# Patient Record
Sex: MALE | URBAN - METROPOLITAN AREA
[De-identification: names, ages, dates, MRNs, and addresses within clinical notes are randomized per-mention and may not be internally consistent; named-entity substitution may affect disease eponyms.]

---

## 2018-06-04 ENCOUNTER — OFFICE VISIT (OUTPATIENT)
Dept: URBAN - METROPOLITAN AREA CLINIC 23 | Facility: CLINIC | Age: 38
End: 2018-06-04
Payer: COMMERCIAL

## 2018-06-04 DIAGNOSIS — H40.1132 PRIMARY OPEN-ANGLE GLAUCOMA, BILATERAL, MODERATE STAGE: Primary | ICD-10-CM

## 2018-06-04 PROCEDURE — 92133 CPTRZD OPH DX IMG PST SGM ON: CPT | Performed by: OPTOMETRIST

## 2018-06-04 PROCEDURE — 92004 COMPRE OPH EXAM NEW PT 1/>: CPT | Performed by: OPTOMETRIST

## 2018-06-04 ASSESSMENT — KERATOMETRY
OD: 42.13
OS: 42.50

## 2018-06-04 ASSESSMENT — INTRAOCULAR PRESSURE
OD: 15
OS: 16

## 2018-06-04 NOTE — IMPRESSION/PLAN
Impression: Primary open-angle glaucoma, bilateral, moderate stage: A90.9072. Plan: Discussed diagnosis in detail with patient. Discussed treatment options with patient. Reassured patient of current condition and treatment. Will continue to monitor IOP. Continue using current medication(s). Continue Timolol QAM OU.

## 2019-03-20 ENCOUNTER — OFFICE VISIT (OUTPATIENT)
Dept: URBAN - METROPOLITAN AREA CLINIC 29 | Facility: CLINIC | Age: 39
End: 2019-03-20
Payer: COMMERCIAL

## 2019-03-20 DIAGNOSIS — H40.1331 PIGMENTARY GLAUCOMA, BILATERAL, MILD STAGE: Primary | ICD-10-CM

## 2019-03-20 PROCEDURE — 92133 CPTRZD OPH DX IMG PST SGM ON: CPT | Performed by: OPTOMETRIST

## 2019-03-20 PROCEDURE — 92014 COMPRE OPH EXAM EST PT 1/>: CPT | Performed by: OPTOMETRIST

## 2019-03-20 RX ORDER — TIMOLOL MALEATE 5 MG/ML
0.5 % SOLUTION/ DROPS OPHTHALMIC
Qty: 1 | Refills: 10 | Status: INACTIVE
Start: 2019-03-20 | End: 2020-05-27

## 2019-03-20 ASSESSMENT — INTRAOCULAR PRESSURE
OS: 17
OD: 19

## 2019-03-20 NOTE — IMPRESSION/PLAN
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 OU. Condition: established, stable. Plan: Discussed diagnosis in detail with patient. Discussed treatment options with patient. No change to current treatment. Will continue to observe condition and or symptoms. Continue using current medication(s). Emphasized and explained compliance.

## 2019-09-19 ENCOUNTER — OFFICE VISIT (OUTPATIENT)
Dept: URBAN - METROPOLITAN AREA CLINIC 29 | Facility: CLINIC | Age: 39
End: 2019-09-19
Payer: COMMERCIAL

## 2019-09-19 PROCEDURE — 99213 OFFICE O/P EST LOW 20 MIN: CPT | Performed by: OPTOMETRIST

## 2019-09-19 PROCEDURE — 76514 ECHO EXAM OF EYE THICKNESS: CPT | Performed by: OPTOMETRIST

## 2019-09-19 ASSESSMENT — INTRAOCULAR PRESSURE
OS: 15
OD: 13

## 2020-05-27 ENCOUNTER — OFFICE VISIT (OUTPATIENT)
Dept: URBAN - METROPOLITAN AREA CLINIC 29 | Facility: CLINIC | Age: 40
End: 2020-05-27
Payer: COMMERCIAL

## 2020-05-27 PROCEDURE — 99213 OFFICE O/P EST LOW 20 MIN: CPT | Performed by: OPTOMETRIST

## 2020-05-27 PROCEDURE — 92083 EXTENDED VISUAL FIELD XM: CPT | Performed by: OPTOMETRIST

## 2020-05-27 RX ORDER — TIMOLOL MALEATE 5 MG/ML
0.5 % SOLUTION/ DROPS OPHTHALMIC
Qty: 1 | Refills: 10 | Status: ACTIVE
Start: 2020-05-27

## 2020-05-27 ASSESSMENT — INTRAOCULAR PRESSURE
OS: 17
OD: 16

## 2020-05-27 NOTE — IMPRESSION/PLAN
Impression: Primary open-angle glaucoma, bilateral, mild stage: H40.1131. Condition: established, stable. Plan: Discussed diagnosis in detail with patient. Discussed treatment options with patient. No change to current treatment. Will continue to observe condition and or symptoms. Continue using current medication(s). Medication refill given today. Emphasized and explained compliance.

## 2020-11-16 ENCOUNTER — OFFICE VISIT (OUTPATIENT)
Dept: URBAN - METROPOLITAN AREA CLINIC 29 | Facility: CLINIC | Age: 40
End: 2020-11-16
Payer: COMMERCIAL

## 2020-11-16 DIAGNOSIS — H40.1131 PRIMARY OPEN-ANGLE GLAUCOMA, BILATERAL, MILD STAGE: Primary | ICD-10-CM

## 2020-11-16 PROCEDURE — 92133 CPTRZD OPH DX IMG PST SGM ON: CPT | Performed by: OPTOMETRIST

## 2020-11-16 PROCEDURE — 99213 OFFICE O/P EST LOW 20 MIN: CPT | Performed by: OPTOMETRIST

## 2020-11-16 ASSESSMENT — INTRAOCULAR PRESSURE
OS: 17
OD: 15

## 2020-11-16 NOTE — IMPRESSION/PLAN
Impression: Primary open-angle glaucoma, bilateral, mild stage: H40.1131. Condition: established, stable. Plan: Discussed diagnosis in detail with patient. Discussed treatment options with patient. No change to current treatment. Will continue to observe condition and or symptoms. Continue using current medication(s), Timolol QAM OU. Emphasized and explained compliance.

## 2020-11-19 ENCOUNTER — OFFICE VISIT (OUTPATIENT)
Dept: URBAN - METROPOLITAN AREA CLINIC 22 | Facility: CLINIC | Age: 40
End: 2020-11-19
Payer: COMMERCIAL

## 2020-11-19 PROCEDURE — 92014 COMPRE OPH EXAM EST PT 1/>: CPT | Performed by: OPTOMETRIST

## 2020-11-19 PROCEDURE — 92310 CONTACT LENS FITTING OU: CPT | Performed by: OPTOMETRIST

## 2020-11-19 ASSESSMENT — INTRAOCULAR PRESSURE
OD: 21
OS: 23

## 2020-11-19 ASSESSMENT — KERATOMETRY
OS: 42.74
OD: 42.19

## 2020-11-19 ASSESSMENT — VISUAL ACUITY
OD: 20/20
OS: 20/20

## 2020-11-19 NOTE — IMPRESSION/PLAN
Impression: Regular astigmatism, bilateral: H52.223. Plan: Order trials dispense and set one week check. Bcc Infiltrative Histology Text: There were numerous aggregates of basaloid cells demonstrating an infiltrative pattern.

## 2020-12-03 ENCOUNTER — TESTING ONLY (OUTPATIENT)
Dept: URBAN - METROPOLITAN AREA CLINIC 22 | Facility: CLINIC | Age: 40
End: 2020-12-03

## 2020-12-03 PROCEDURE — 92310 CONTACT LENS FITTING OU: CPT | Performed by: OPTOMETRIST

## 2021-02-03 ENCOUNTER — TESTING ONLY (OUTPATIENT)
Dept: URBAN - METROPOLITAN AREA CLINIC 22 | Facility: CLINIC | Age: 41
End: 2021-02-03

## 2021-02-03 DIAGNOSIS — H52.223 REGULAR ASTIGMATISM, BILATERAL: Primary | ICD-10-CM

## 2021-02-03 PROCEDURE — 92310 CONTACT LENS FITTING OU: CPT | Performed by: OPTOMETRIST

## 2021-02-03 NOTE — IMPRESSION/PLAN
Impression: Regular astigmatism, bilateral: H52.223.  Bilateral. Plan: order new trials cls, follow up in 2 weeks

## 2021-03-01 ENCOUNTER — TESTING ONLY (OUTPATIENT)
Dept: URBAN - METROPOLITAN AREA CLINIC 22 | Facility: CLINIC | Age: 41
End: 2021-03-01

## 2021-03-01 PROCEDURE — 92310 CONTACT LENS FITTING OU: CPT | Performed by: OPTOMETRIST

## 2021-03-01 NOTE — IMPRESSION/PLAN
Impression: Regular astigmatism, bilateral: H52.223. Bilateral. Plan: Finalized CLS rx. PT educated on corrective lens per his request. If RNFL or CD changes consider adding Latanoprost to bring pressure to 12. Consider PRK instead of LASIK.

## 2021-05-24 ENCOUNTER — TESTING ONLY (OUTPATIENT)
Dept: URBAN - METROPOLITAN AREA CLINIC 25 | Facility: CLINIC | Age: 41
End: 2021-05-24
Payer: COMMERCIAL

## 2021-05-24 PROCEDURE — 99212 OFFICE O/P EST SF 10 MIN: CPT | Performed by: OPTOMETRIST

## 2021-05-24 ASSESSMENT — INTRAOCULAR PRESSURE
OS: 20
OS: 19
OD: 14
OD: 16

## 2021-05-24 NOTE — IMPRESSION/PLAN
Impression: Primary open-angle glaucoma, bilateral, mild stage: H40.1131. Plan: pt edu. continue care with dr Chitra Gloria for pigmentary glc.  6 mo iop check and OCT.